# Patient Record
Sex: FEMALE | URBAN - METROPOLITAN AREA
[De-identification: names, ages, dates, MRNs, and addresses within clinical notes are randomized per-mention and may not be internally consistent; named-entity substitution may affect disease eponyms.]

---

## 2023-06-07 ENCOUNTER — APPOINTMENT (OUTPATIENT)
Dept: URBAN - METROPOLITAN AREA CLINIC 305 | Age: 48
Setting detail: DERMATOLOGY
End: 2023-06-08

## 2023-06-07 DIAGNOSIS — L90.8 OTHER ATROPHIC DISORDERS OF SKIN: ICD-10-CM

## 2023-06-07 PROCEDURE — OTHER COSMETIC CONSULTATION: FILLERS: OTHER

## 2023-06-07 PROCEDURE — OTHER ADDITIONAL NOTES: OTHER

## 2023-06-07 PROCEDURE — OTHER BOTOX: OTHER

## 2023-06-07 NOTE — PROCEDURE: ADDITIONAL NOTES
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 Daughter has a birthmark she wants treated, referred by mario, used more on one side of periorbital area due to uneven brows. 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 Daughter has a birthmark she wants treated, referred by mario, used more on one side of periorbital area due to uneven brows. Going to [place] tomorrow

## 2023-06-07 NOTE — PROCEDURE: BOTOX
L Brow Units: 0
Show Periorbital Units: Yes
Glabellar Complex Units: 15
Consent: Written consent obtained. Risks include but not limited to lid/brow ptosis, bruising, swelling, diplopia, temporary effect, incomplete chemical denervation.
Show Lcl Units: No
Post-Care Instructions: Patient instructed to not lie down for 4 hours and limit physical activity for 24 hours.
Periorbital Skin Units: 11
Detail Level: Detailed
Incrementing Botox Units: By 0.5 Units
Dilution (U/0.1 Cc): 4
Additional Area 1 Location: [facility]
Show Inventory Tab: Show

## 2023-08-30 ENCOUNTER — APPOINTMENT (OUTPATIENT)
Dept: URBAN - METROPOLITAN AREA CLINIC 305 | Age: 48
Setting detail: DERMATOLOGY
End: 2023-08-30

## 2023-08-30 DIAGNOSIS — Z41.9 ENCOUNTER FOR PROCEDURE FOR PURPOSES OTHER THAN REMEDYING HEALTH STATE, UNSPECIFIED: ICD-10-CM

## 2023-08-30 DIAGNOSIS — L90.8 OTHER ATROPHIC DISORDERS OF SKIN: ICD-10-CM

## 2023-08-30 PROCEDURE — OTHER ADDITIONAL NOTES: OTHER

## 2023-08-30 PROCEDURE — OTHER FILLERS: OTHER

## 2023-08-30 NOTE — PROCEDURE: ADDITIONAL NOTES
Additional Notes: Wanted nlf treatment but told patient she needs two syringes in cheeks first. Appeared happy but opted for one syringe. Still may want nlf treated. 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 Daughter has a birthmark she wants treated, referred by mario, used more on one side of periorbital area due to uneven brows. Going to [place] tomorrow Additional Notes: Wanted nlf treatment but told patient she needs two syringes in cheeks first. Appeared happy but opted for one syringe. Still may want nlf treated. 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 Daughter has a birthmark she wants treated, referred by mario, used more on one side of periorbital area due to uneven brows. Going to [place] tomorrow

## 2023-08-30 NOTE — PROCEDURE: FILLERS
Use Map Statement For Sites (Optional): No
Marionette Lines Filler  Volume In Cc: 0
Map Statment: See Attach Map for Complete Details
Number Of Syringes (Required For Inventory): 1
Filler: RHA 4
Inventory Information: This plan will send filler information to inventory based on the fillers you select. Multiple fillers can be sent but you must ensure you select the appropriate fillers in the inventory tab.
Detail Level: Detailed
Anesthesia Type: 1% lidocaine with epinephrine
Consent: Written consent obtained. Risks include but not limited to bruising, beading, irregular texture, ulceration, infection, allergic reaction, scar formation, incomplete augmentation, temporary nature, procedural pain.
Post-Care Instructions: Patient instructed to apply ice to reduce swelling. Given post tissue augmentation sheet and an ice pack. Avoid pressure to areas of face injected for two weeks. No activities leading to sweating for two days
Show Inventory Tab: Show
Anesthesia Volume In Cc: 0.5
Additional Anesthesia Volume In Cc: 6

## 2023-12-08 ENCOUNTER — APPOINTMENT (OUTPATIENT)
Dept: URBAN - METROPOLITAN AREA CLINIC 305 | Age: 48
Setting detail: DERMATOLOGY
End: 2023-12-08

## 2023-12-08 DIAGNOSIS — L90.8 OTHER ATROPHIC DISORDERS OF SKIN: ICD-10-CM

## 2023-12-08 PROCEDURE — OTHER DYSPORT: OTHER

## 2023-12-08 PROCEDURE — OTHER ADDITIONAL NOTES: OTHER

## 2023-12-08 NOTE — PROCEDURE: DYSPORT
Detail Level: Detailed
Show Levator Superior Units: Yes
Left Periorbital Skin Units: 0
Dilution (U/0.1 Cc): 25
Consent: Written consent obtained. Risks include but not limited to lid/brow ptosis, bruising, swelling, diplopia, temporary effect, incomplete chemical denervation.
Additional Area 1 Location: orbicularis oris
Show Ucl Units: No
Post-Care Instructions: Patient instructed to not lie down for 4 hours and limit physical activity for 24 hours.
Show Inventory Tab: Show
Additional Area 2 Location: [facility]
Periorbital Skin Units: 40

## 2023-12-08 NOTE — PROCEDURE: ADDITIONAL NOTES
Detail Level: Simple
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 Daughter has a birthmark she wants treated, referred by mario, used more on one side of periorbital area due to uneven brows. Going to [place] tomorrow
Render Risk Assessment In Note?: no

## 2024-03-06 ENCOUNTER — APPOINTMENT (OUTPATIENT)
Dept: URBAN - METROPOLITAN AREA CLINIC 305 | Age: 49
Setting detail: DERMATOLOGY
End: 2024-03-06

## 2024-03-06 DIAGNOSIS — L90.8 OTHER ATROPHIC DISORDERS OF SKIN: ICD-10-CM

## 2024-03-06 PROCEDURE — OTHER DYSPORT: OTHER

## 2024-03-06 PROCEDURE — OTHER ADDITIONAL NOTES: OTHER

## 2024-03-06 NOTE — PROCEDURE: ADDITIONAL NOTES
Detail Level: Simple
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 Daughter has a birthmark she wants treated, referred by mario, used more on one side of periorbital area due to uneven brows. Going to [place] tomorrow
Render Risk Assessment In Note?: no

## 2024-03-06 NOTE — PROCEDURE: DYSPORT
Detail Level: Detailed
Show Levator Superior Units: Yes
Left Periorbital Skin Units: 0
Dilution (U/0.1 Cc): 25
Consent: Written consent obtained. Risks include but not limited to lid/brow ptosis, bruising, swelling, diplopia, temporary effect, incomplete chemical denervation.
Forehead Units: 20
Additional Area 1 Location: orbicularis oris
Show Ucl Units: No
Glabellar Complex Units: 37.5
Post-Care Instructions: Patient instructed to not lie down for 4 hours and limit physical activity for 24 hours.
Show Inventory Tab: Show
Additional Area 2 Location: [facility]
Periorbital Skin Units: 40

## 2024-07-31 ENCOUNTER — APPOINTMENT (OUTPATIENT)
Dept: URBAN - METROPOLITAN AREA CLINIC 305 | Age: 49
Setting detail: DERMATOLOGY
End: 2024-07-31

## 2024-07-31 DIAGNOSIS — L90.8 OTHER ATROPHIC DISORDERS OF SKIN: ICD-10-CM

## 2024-07-31 PROCEDURE — OTHER DYSPORT: OTHER

## 2024-07-31 PROCEDURE — OTHER ADDITIONAL NOTES: OTHER

## 2024-07-31 NOTE — PROCEDURE: DYSPORT
Detail Level: Detailed
Show Levator Superior Units: Yes
Left Periorbital Skin Units: 0
Dilution (U/0.1 Cc): 25
Consent: Written consent obtained. Risks include but not limited to lid/brow ptosis, bruising, swelling, diplopia, temporary effect, incomplete chemical denervation.
Forehead Units: 20
Additional Area 1 Location: orbicularis oris
Show Ucl Units: No
Glabellar Complex Units: 37.5
Post-Care Instructions: Patient instructed to not lie down for 4 hours and limit physical activity for 24 hours.
Show Inventory Tab: Show
Additional Area 2 Location: [facility]
Periorbital Skin Units: 40

## 2024-07-31 NOTE — PROCEDURE: ADDITIONAL NOTES
Detail Level: Simple
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 Daughter has a birthmark she wants treated, referred by mario, used more on one side of periorbital area due to uneven brows. Going to [place] tomorrow
Render Risk Assessment In Note?: no

## 2024-08-01 ENCOUNTER — APPOINTMENT (OUTPATIENT)
Dept: URBAN - METROPOLITAN AREA CLINIC 305 | Age: 49
Setting detail: DERMATOLOGY
End: 2024-08-01

## 2024-08-01 DIAGNOSIS — Z41.9 ENCOUNTER FOR PROCEDURE FOR PURPOSES OTHER THAN REMEDYING HEALTH STATE, UNSPECIFIED: ICD-10-CM

## 2024-08-01 PROCEDURE — OTHER HYDRAFACIAL: OTHER

## 2024-08-01 ASSESSMENT — LOCATION SIMPLE DESCRIPTION DERM: LOCATION SIMPLE: INFERIOR FOREHEAD

## 2024-08-01 ASSESSMENT — LOCATION DETAILED DESCRIPTION DERM: LOCATION DETAILED: INFERIOR MID FOREHEAD

## 2024-08-01 ASSESSMENT — LOCATION ZONE DERM: LOCATION ZONE: FACE

## 2024-08-01 NOTE — PROCEDURE: HYDRAFACIAL
Vacuum Pressure High Setting (Will Not Render If Set To 0): 0
Solution: GlySal 15%
Solution Override: brightalive
Tip: Hydropeel Tip, Clear
Consent: Written consent obtained, risks reviewed including but not limited to crusting, scabbing, blistering, scarring, darker or lighter pigmentary change, bruising, and/or incomplete response.
Tip: Hydropeel Tip, Blue
Post-Care Instructions: I reviewed with the patient in detail post-care instructions. Patient should stay away from the sun and wear sun protection until treated areas are fully healed.
Price (Use Numbers Only, No Special Characters Or $): 533
Treatment Number: 1
Procedure: Extend and Protect
Procedure: Boost
Procedure: Exfoliation
Tip: Hydropeel Tip, Teal
Solution Override
Solution: Beta-HD
Indication: skin texture
Solution Override: Red LED
Location: face
Solution: Antiox-6
Procedure: Peel
Procedure: Extraction
Tip Override
Solution: Activ-4

## 2024-08-01 NOTE — HPI: COSMETIC (HYDRAFACIAL)
When Outside In The Sun, Do You...: rarely burns, mostly tans
Additional History: Cleanse with gentle wash. Remove with hot towel. Deluxe hydrafacial with brightalive booster and 15% peel. Red LED therapy. Complexion serum. Hydro drops. Growth factor. Intellishade.

## 2024-12-06 ENCOUNTER — APPOINTMENT (OUTPATIENT)
Dept: URBAN - METROPOLITAN AREA CLINIC 305 | Age: 49
Setting detail: DERMATOLOGY
End: 2024-12-06

## 2024-12-06 DIAGNOSIS — L90.8 OTHER ATROPHIC DISORDERS OF SKIN: ICD-10-CM

## 2024-12-06 PROCEDURE — OTHER ADDITIONAL NOTES: OTHER

## 2024-12-06 PROCEDURE — OTHER DYSPORT: OTHER

## 2024-12-06 NOTE — PROCEDURE: DYSPORT
Detail Level: Detailed
Show Levator Superior Units: Yes
Left Periorbital Skin Units: 0
Dilution (U/0.1 Cc): 25
Consent: Written consent obtained. Risks include but not limited to lid/brow ptosis, bruising, swelling, diplopia, temporary effect, incomplete chemical denervation.
Forehead Units: 20
Additional Area 1 Location: orbicularis oris
Show Ucl Units: No
Glabellar Complex Units: 37.5
Post-Care Instructions: Patient instructed to not lie down for 4 hours and limit physical activity for 24 hours.
Show Inventory Tab: Show
Additional Area 2 Location: [facility]
Periorbital Skin Units: 40

## 2024-12-06 NOTE — PROCEDURE: ADDITIONAL NOTES
Detail Level: Simple
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 Daughter has a birthmark she wants treated, referred by mario, used more on one side of periorbital area due to uneven brows. Going to [place] tomorrow
Render Risk Assessment In Note?: no